# Patient Record
Sex: FEMALE | Race: WHITE | NOT HISPANIC OR LATINO | Employment: UNEMPLOYED | URBAN - NONMETROPOLITAN AREA
[De-identification: names, ages, dates, MRNs, and addresses within clinical notes are randomized per-mention and may not be internally consistent; named-entity substitution may affect disease eponyms.]

---

## 2020-11-16 ENCOUNTER — TRANSCRIBE ORDERS (OUTPATIENT)
Dept: ADMINISTRATIVE | Facility: HOSPITAL | Age: 40
End: 2020-11-16

## 2020-11-16 ENCOUNTER — LAB (OUTPATIENT)
Dept: LAB | Facility: HOSPITAL | Age: 40
End: 2020-11-16

## 2020-11-16 DIAGNOSIS — E87.0 TYPE I DIABETES MELLITUS WITH HYPEROSMOLAR COMA (HCC): Primary | ICD-10-CM

## 2020-11-16 DIAGNOSIS — E10.65 TYPE I DIABETES MELLITUS WITH HYPEROSMOLAR COMA (HCC): ICD-10-CM

## 2020-11-16 DIAGNOSIS — E10.65 TYPE I DIABETES MELLITUS WITH HYPEROSMOLAR COMA (HCC): Primary | ICD-10-CM

## 2020-11-16 DIAGNOSIS — E10.69 TYPE I DIABETES MELLITUS WITH HYPEROSMOLAR COMA (HCC): ICD-10-CM

## 2020-11-16 DIAGNOSIS — E10.69 TYPE I DIABETES MELLITUS WITH HYPEROSMOLAR COMA (HCC): Primary | ICD-10-CM

## 2020-11-16 DIAGNOSIS — E87.0 TYPE I DIABETES MELLITUS WITH HYPEROSMOLAR COMA (HCC): ICD-10-CM

## 2020-11-16 LAB
ALBUMIN SERPL-MCNC: 4.3 G/DL (ref 3.5–5.2)
ALBUMIN/GLOB SERPL: 1.7 G/DL
ALP SERPL-CCNC: 31 U/L (ref 39–117)
ALT SERPL W P-5'-P-CCNC: 9 U/L (ref 1–33)
ANION GAP SERPL CALCULATED.3IONS-SCNC: 6.8 MMOL/L (ref 5–15)
AST SERPL-CCNC: 15 U/L (ref 1–32)
BILIRUB SERPL-MCNC: 0.2 MG/DL (ref 0–1.2)
BUN SERPL-MCNC: 15 MG/DL (ref 6–20)
BUN/CREAT SERPL: 16.3 (ref 7–25)
CALCIUM SPEC-SCNC: 8.8 MG/DL (ref 8.6–10.5)
CHLORIDE SERPL-SCNC: 105 MMOL/L (ref 98–107)
CHOLEST SERPL-MCNC: 142 MG/DL (ref 0–200)
CO2 SERPL-SCNC: 26.2 MMOL/L (ref 22–29)
CREAT SERPL-MCNC: 0.92 MG/DL (ref 0.57–1)
GFR SERPL CREATININE-BSD FRML MDRD: 68 ML/MIN/1.73
GLOBULIN UR ELPH-MCNC: 2.6 GM/DL
GLUCOSE SERPL-MCNC: 137 MG/DL (ref 65–99)
HBA1C MFR BLD: 7.5 % (ref 4.8–5.6)
HDLC SERPL-MCNC: 58 MG/DL (ref 40–60)
LDLC SERPL CALC-MCNC: 75 MG/DL (ref 0–100)
LDLC/HDLC SERPL: 1.32 {RATIO}
POTASSIUM SERPL-SCNC: 3.8 MMOL/L (ref 3.5–5.2)
PROT SERPL-MCNC: 6.9 G/DL (ref 6–8.5)
SODIUM SERPL-SCNC: 138 MMOL/L (ref 136–145)
TRIGL SERPL-MCNC: 36 MG/DL (ref 0–150)
VLDLC SERPL-MCNC: 9 MG/DL (ref 5–40)

## 2020-11-16 PROCEDURE — 83036 HEMOGLOBIN GLYCOSYLATED A1C: CPT

## 2020-11-16 PROCEDURE — 36415 COLL VENOUS BLD VENIPUNCTURE: CPT

## 2020-11-16 PROCEDURE — 80053 COMPREHEN METABOLIC PANEL: CPT

## 2020-11-16 PROCEDURE — 82043 UR ALBUMIN QUANTITATIVE: CPT

## 2020-11-16 PROCEDURE — 80061 LIPID PANEL: CPT

## 2020-11-17 LAB — ALBUMIN UR-MCNC: <1.2 MG/DL

## 2021-06-12 ENCOUNTER — APPOINTMENT (OUTPATIENT)
Dept: CT IMAGING | Facility: HOSPITAL | Age: 41
End: 2021-06-12

## 2021-06-12 ENCOUNTER — HOSPITAL ENCOUNTER (EMERGENCY)
Facility: HOSPITAL | Age: 41
Discharge: HOME | End: 2021-06-12
Attending: EMERGENCY MEDICINE | Admitting: EMERGENCY MEDICINE

## 2021-06-12 VITALS
TEMPERATURE: 98.3 F | HEIGHT: 66 IN | RESPIRATION RATE: 20 BRPM | WEIGHT: 167.2 LBS | SYSTOLIC BLOOD PRESSURE: 131 MMHG | HEART RATE: 84 BPM | BODY MASS INDEX: 26.87 KG/M2 | OXYGEN SATURATION: 100 % | DIASTOLIC BLOOD PRESSURE: 84 MMHG

## 2021-06-12 DIAGNOSIS — S32.2XXS SACRUM AND COCCYX FRACTURE, SEQUELA: ICD-10-CM

## 2021-06-12 DIAGNOSIS — S22.060A COMPRESSION FRACTURE OF T8 VERTEBRA, INITIAL ENCOUNTER (HCC): Primary | ICD-10-CM

## 2021-06-12 DIAGNOSIS — S32.10XS SACRUM AND COCCYX FRACTURE, SEQUELA: ICD-10-CM

## 2021-06-12 PROCEDURE — 25010000002 MORPHINE PER 10 MG: Performed by: NURSE PRACTITIONER

## 2021-06-12 PROCEDURE — 72131 CT LUMBAR SPINE W/O DYE: CPT

## 2021-06-12 PROCEDURE — 96374 THER/PROPH/DIAG INJ IV PUSH: CPT

## 2021-06-12 PROCEDURE — 99283 EMERGENCY DEPT VISIT LOW MDM: CPT

## 2021-06-12 PROCEDURE — 96375 TX/PRO/DX INJ NEW DRUG ADDON: CPT

## 2021-06-12 PROCEDURE — 25010000002 ONDANSETRON PER 1 MG: Performed by: NURSE PRACTITIONER

## 2021-06-12 PROCEDURE — 72128 CT CHEST SPINE W/O DYE: CPT

## 2021-06-12 RX ORDER — MORPHINE SULFATE 4 MG/ML
4 INJECTION, SOLUTION INTRAMUSCULAR; INTRAVENOUS ONCE
Status: COMPLETED | OUTPATIENT
Start: 2021-06-12 | End: 2021-06-12

## 2021-06-12 RX ORDER — HYDROCODONE BITARTRATE AND ACETAMINOPHEN 7.5; 325 MG/1; MG/1
1 TABLET ORAL EVERY 6 HOURS PRN
Qty: 12 TABLET | Refills: 0 | Status: SHIPPED | OUTPATIENT
Start: 2021-06-12

## 2021-06-12 RX ORDER — SODIUM CHLORIDE 0.9 % (FLUSH) 0.9 %
10 SYRINGE (ML) INJECTION AS NEEDED
Status: DISCONTINUED | OUTPATIENT
Start: 2021-06-12 | End: 2021-06-12 | Stop reason: HOSPADM

## 2021-06-12 RX ORDER — INSULIN GLARGINE 100 [IU]/ML
INJECTION, SOLUTION SUBCUTANEOUS
COMMUNITY

## 2021-06-12 RX ORDER — ONDANSETRON 4 MG/1
4 TABLET, ORALLY DISINTEGRATING ORAL EVERY 6 HOURS PRN
Qty: 20 TABLET | Refills: 0 | Status: SHIPPED | OUTPATIENT
Start: 2021-06-12

## 2021-06-12 RX ORDER — ONDANSETRON 2 MG/ML
4 INJECTION INTRAMUSCULAR; INTRAVENOUS ONCE
Status: COMPLETED | OUTPATIENT
Start: 2021-06-12 | End: 2021-06-12

## 2021-06-12 RX ORDER — HYDROCODONE BITARTRATE AND ACETAMINOPHEN 7.5; 325 MG/1; MG/1
1 TABLET ORAL ONCE
Status: COMPLETED | OUTPATIENT
Start: 2021-06-12 | End: 2021-06-12

## 2021-06-12 RX ADMIN — HYDROCODONE BITARTRATE AND ACETAMINOPHEN 1 TABLET: 7.5; 325 TABLET ORAL at 13:38

## 2021-06-12 RX ADMIN — MORPHINE SULFATE 4 MG: 4 INJECTION, SOLUTION INTRAMUSCULAR; INTRAVENOUS at 12:04

## 2021-06-12 RX ADMIN — ONDANSETRON 4 MG: 2 INJECTION INTRAMUSCULAR; INTRAVENOUS at 12:04

## 2021-06-12 NOTE — ED NOTES
Unable to contact [employer] (On-Call), Pt is tired of waiting and wishes to be D/C without TSLO brace.      Jenise Manning, RN  06/12/21 7183

## 2021-06-12 NOTE — ED PROVIDER NOTES
Subjective   History of Present Illness  \this is a 40 year old female who comes in today after a slip and fall on her stairs. She reports she lost her balance striking the top stairs with the middle of her back. She also hit the lower back near her buttocks as well. She denies any loss of bowel or bladder. She reports taking otc meds with some relief.   Review of Systems   Constitutional: Negative.    HENT: Negative.    Eyes: Negative.    Respiratory: Negative.    Cardiovascular: Negative.    Gastrointestinal: Negative.    Genitourinary: Negative.    Musculoskeletal: Positive for back pain.   Skin: Negative.    Neurological: Negative.    Psychiatric/Behavioral: Negative.        Past Medical History:   Diagnosis Date   • Diabetes mellitus (CMS/HCC)        No Known Allergies    Past Surgical History:   Procedure Laterality Date   •  SECTION     • EYE SURGERY      x 3   • TONSILLECTOMY         History reviewed. No pertinent family history.    Social History     Socioeconomic History   • Marital status:      Spouse name: Not on file   • Number of children: Not on file   • Years of education: Not on file   • Highest education level: Not on file   Tobacco Use   • Smoking status: Never Smoker   • Smokeless tobacco: Never Used   Substance and Sexual Activity   • Alcohol use: No   • Drug use: No   • Sexual activity: Defer           Objective   Physical Exam  Vitals and nursing note reviewed.   Constitutional:       Appearance: Normal appearance. She is normal weight.   Neurological:      Mental Status: She is alert.     Primary survey  Airway patent  Bilateral breath sounds  Strong radial and femoral pulses  GCS 15    Secondary survey  general: patient appears uncomfortable, nontoxic  Head: Atraumatic, normocephalic  Eyes: Pupils equal round reactive to light, extra ocular movements are intact, vision grossly normal  ENT: No facial step-offs, no dental malocclusion  Neck: Nontender to palpation of the  C-spine, full range of motion, trachea midline  Chest: Nontender to palpation  Cardiovascular: Regular rate  Lungs: Bilateral breath sounds, clear to auscultation bilaterally  Back: T and L-spines are tender to palpation, no step offs  Abdomen: Soft, nontender, nondistended  Pelvis: Stable  Extremities: Full range of motion in all 4 extremities, no evidence of gross deformity or laceration  Neuro: Sensation grossly intact to light touch in all 4 extremities        Procedures           ED Course  ED Course as of Jun 12 1443   Sat Jun 12, 2021   1333 Unable to reach the [employer] representative to place brace. Patient will follow up with Dr. Garcia for placement. I have discussed the plan and they are agreeable.     [TW]      ED Course User Index  [TW] Christiana Melgar, APRN                                           MDM  Number of Diagnoses or Management Options     Amount and/or Complexity of Data Reviewed  Tests in the radiology section of CPT®: ordered and reviewed  Review and summarize past medical records: yes  Discuss the patient with other providers: yes    Risk of Complications, Morbidity, and/or Mortality  Presenting problems: moderate  Diagnostic procedures: moderate  Management options: moderate        Final diagnoses:   Compression fracture of T8 vertebra, initial encounter (CMS/[facility])   Sacrum and coccyx fracture, sequela       ED Disposition  ED Disposition     ED Disposition Condition Comment    Discharge Stable           Ulisses Garcia MD  [address] [place] [address] [phone]    Schedule an appointment as soon as possible for a visit            Medication List      New Prescriptions    HYDROcodone-acetaminophen 7.5-325 MG per tablet  Commonly known as: NORCO  Take 1 tablet by mouth Every 6 (Six) Hours As Needed for Moderate Pain .     ondansetron ODT 4 MG disintegrating tablet  Commonly known as: ZOFRAN-ODT  Place 1 tablet on the tongue Every 6 (Six) Hours As Needed for Nausea.            Where to Get Your Medications      These medications were sent to [id number] - [place], [place] - [address] [place] [address] [address]OP[address] [place] AT [facility] [place] & - [phone] PH - [phone] FX  [address] [facility] [address]    Phone: [phone]   · HYDROcodone-acetaminophen 7.5-325 MG per tablet  · ondansetron ODT 4 MG disintegrating tablet          Christiana Melgar, FLASH  06/12/21 1349       Christiana Melgar, FLASH  06/12/21 1446

## 2021-10-21 ENCOUNTER — TRANSCRIBE ORDERS (OUTPATIENT)
Dept: ADMINISTRATIVE | Facility: HOSPITAL | Age: 41
End: 2021-10-21

## 2021-10-21 DIAGNOSIS — R01.1 MURMUR, HEART: Primary | ICD-10-CM

## 2021-10-21 DIAGNOSIS — R00.2 PALPITATIONS: ICD-10-CM

## 2021-12-21 ENCOUNTER — TRANSCRIBE ORDERS (OUTPATIENT)
Dept: ADMINISTRATIVE | Facility: HOSPITAL | Age: 41
End: 2021-12-21

## 2021-12-21 DIAGNOSIS — S22.060D CLOSED WEDGE COMPRESSION FRACTURE OF T7 VERTEBRA WITH ROUTINE HEALING: Primary | ICD-10-CM

## 2022-01-12 ENCOUNTER — HOSPITAL ENCOUNTER (OUTPATIENT)
Dept: MRI IMAGING | Facility: HOSPITAL | Age: 42
Discharge: HOME | End: 2022-01-12
Admitting: ORTHOPAEDIC SURGERY

## 2022-01-12 DIAGNOSIS — S22.060D CLOSED WEDGE COMPRESSION FRACTURE OF T7 VERTEBRA WITH ROUTINE HEALING: ICD-10-CM

## 2022-01-12 PROCEDURE — 72146 MRI CHEST SPINE W/O DYE: CPT

## 2022-05-10 ENCOUNTER — TREATMENT (OUTPATIENT)
Dept: PHYSICAL THERAPY | Facility: CLINIC | Age: 42
End: 2022-05-10

## 2022-05-10 DIAGNOSIS — S22.060D CLOSED WEDGE COMPRESSION FRACTURE OF T8 VERTEBRA WITH ROUTINE HEALING, SUBSEQUENT ENCOUNTER: ICD-10-CM

## 2022-05-10 DIAGNOSIS — Z87.81 HISTORY OF COMPRESSION FRACTURE OF SPINE: Primary | ICD-10-CM

## 2022-05-10 PROCEDURE — 97161 PT EVAL LOW COMPLEX 20 MIN: CPT | Performed by: PHYSICAL THERAPIST

## 2022-05-10 NOTE — PROGRESS NOTES
Physical Therapy Initial Evaluation and Plan of Care      Patient: Ashleigh Ward   : 1980  Diagnosis/ICD-10 Code:  No primary diagnosis found.  Referring practitioner: Pavel Robertson, *    Subjective Evaluation    History of Present Illness  Date of onset: 2021  Mechanism of injury: Pt reports falling on her tailbone and breaking her T8 and tailbone last summer. Pt reports that her tailbone is fine and according to MRI and xrays show, her T8 has healed well although she has had pain right under L shoulder blade around T8. Pt states that standing makes pain worse. Pt reports that slumping over makes pain better.       Patient Occupation: Pharmacy Pain  Current pain ratin  At best pain ratin  At worst pain ratin  Quality: throbbing and dull ache  Relieving factors: medications and ice  Aggravating factors: standing and ambulation  Progression: no change    Social Support  Lives in: multiple-level home  Lives with: spouse and young children    Diagnostic Tests  X-ray: normal  MRI studies: normal    Treatments  Previous treatment: medication  Current treatment: medication  Patient Goals  Patient goals for therapy: decreased pain, increased motion and increased strength             Objective          Palpation   Left   Tenderness of the lower trapezius.     Right   No palpable tenderness to the lower trapezius.     Tenderness   Cervical Spine   Tenderness in the spinous process.     Additional Tenderness Details  T6 tender with diffuse tenderness from t5-t8    Neurological Testing     Reflexes   Left   Biceps (C5/C6): normal (2+)  Brachioradialis (C6): normal (2+)  Triceps (C7): normal (2+)  Patellar (L4): normal (2+)  Achilles (S1): normal (2+)    Right   Biceps (C5/C6): normal (2+)  Brachioradialis (C6): normal (2+)  Triceps (C7): normal (2+)  Patellar (L4): normal (2+)  Achilles (S1): normal (2+)    Active Range of Motion   Cervical/Thoracic Spine     Thoracic   Flexion:  WFL  Extension: WFL  Left lateral flexion: WFL  Right lateral flexion: WFL  Left rotation: WFL  Right rotation: WFL    Lumbar   Flexion: WFL  Extension: WFL  Left lateral flexion: WFL  Right lateral flexion: WFL  Left rotation: WFL  Right rotation: WFL          Assessment & Plan     Assessment  Impairments: activity intolerance, impaired physical strength, lacks appropriate home exercise program and pain with function  Functional Limitations: lifting, uncomfortable because of pain and standing  Assessment details: Patient is a 41 year old female who comes to physical therapy following a compression fracture in the thoracic spine. Signs and symptoms are consistent with immobility following fracture in the spine resulting in pain, decreased strength, and inability to perform all essential functional activities. Pt will benefit from skilled PT services to address the above issues.     Prognosis: good    Goals  Plan Goals: Short Term Goals (2 weeks)  1. Pt will demonstrate independence with HEP  2. Pt will have no more than 2/10 pain in the back and ribs following work shift  3. Pt will have no pain with 30 mins of activities for the back in the gym    Long Term Goals (6 weeks)  1. Pt will have no increased pain in the thoracic spine and ribs following a work shift.   2. Pt will have no tenderness in the L thoracic spine with palpation  3. Pt will be able to tolerate 60 mins of activities in the clinic without pain in the thoracic spine.     Plan  Therapy options: will be seen for skilled therapy services  Planned modality interventions: dry needling, cryotherapy, TENS and ultrasound  Planned therapy interventions: abdominal trunk stabilization, body mechanics training, flexibility, functional ROM exercises, home exercise program, joint mobilization, manual therapy, motor coordination training, neuromuscular re-education, postural training, soft tissue mobilization, spinal/joint mobilization, strengthening, stretching  and therapeutic activities  Frequency: 2x week  Duration in weeks: 6  Treatment plan discussed with: patient        Manual Therapy:         mins  79627;  Therapeutic Exercise:         mins  54781;     Neuromuscular Lisa:        mins  22027;    Therapeutic Activity:          mins  81669;     Gait Training:           mins  42302;     Ultrasound:          mins  51916;    Electrical Stimulation:         mins  82244 ( );  Dry Needling          mins self-pay    Timed Treatment:      mins   Total Treatment:     41   mins    PT SIGNATURE: Javid Peralta PT   [place] License: [id number]  DATE TREATMENT INITIATED: 5/10/2022    Initial Certification  Certification Period: 8/7/2022  I certify that the therapy services are furnished while this patient is under my care.  The services outlined above are required by this patient, and will be reviewed every 90 days.     PHYSICIAN: Pavel Robertson MD      DATE:     Please sign and return via fax to [phone].. Thank you, [facility].

## 2022-05-17 ENCOUNTER — TREATMENT (OUTPATIENT)
Dept: PHYSICAL THERAPY | Facility: CLINIC | Age: 42
End: 2022-05-17

## 2022-05-17 DIAGNOSIS — S22.060D CLOSED WEDGE COMPRESSION FRACTURE OF T8 VERTEBRA WITH ROUTINE HEALING, SUBSEQUENT ENCOUNTER: ICD-10-CM

## 2022-05-17 DIAGNOSIS — Z87.81 HISTORY OF COMPRESSION FRACTURE OF SPINE: Primary | ICD-10-CM

## 2022-05-17 PROCEDURE — 97110 THERAPEUTIC EXERCISES: CPT | Performed by: PHYSICAL THERAPIST

## 2022-05-17 PROCEDURE — 97112 NEUROMUSCULAR REEDUCATION: CPT | Performed by: PHYSICAL THERAPIST

## 2022-05-17 PROCEDURE — 97140 MANUAL THERAPY 1/> REGIONS: CPT | Performed by: PHYSICAL THERAPIST

## 2022-05-19 NOTE — PROGRESS NOTES
Physical Therapy Daily Progress Note      Visit #: 2    Ashleigh Ward reports 1-2/10 pain today at rest.  Pt reports that she felt better for a couple days after her thoracic spine popped. Pt reports that the stretches feel good but only give temporary relief. Pt reports that she still has discomfort in the thoracic spine that is extending into the chest.         Objective Pt present to PT today with no distress at rest.     Pt with no increased pain in the thoracic spine with activities today.     Pt with tenderness in the L thoracic spine with palpation from T 6-10.       See Exercise, Manual, and Modality Logs for complete treatment.     Assessment/Plan  Pt is doing well with stretching and scapular strengthening activities. Pt seemed to do well with needling today and will follow up with PT as tolerated and will assess reaction to treatment.       Progress per Plan of Care      Visit Diagnosis:    ICD-10-CM ICD-9-CM   1. History of compression fracture of spine  Z87.81 V15.51   2. Closed wedge compression fracture of T8 vertebra with routine healing, subsequent encounter  S22.060D V54.17            Manual Therapy:  17      mins  54343;  Therapeutic Exercise:    17     mins  32539;     Neuromuscular Lisa:    10    mins  28477;    Therapeutic Activity:          mins  45931;     Gait Training:           mins  77978;     Ultrasound:          mins  26002;    Electrical Stimulation:         mins  84088 ( );  Dry Needling          mins self-pay  Iontophoresis          mins 78213      Timed Treatment:   44   mins   Total Treatment:     57   mins    Javid Peralta, PT  Physical Therapist

## 2022-05-20 ENCOUNTER — TREATMENT (OUTPATIENT)
Dept: PHYSICAL THERAPY | Facility: CLINIC | Age: 42
End: 2022-05-20

## 2022-05-20 DIAGNOSIS — S22.060D CLOSED WEDGE COMPRESSION FRACTURE OF T8 VERTEBRA WITH ROUTINE HEALING, SUBSEQUENT ENCOUNTER: ICD-10-CM

## 2022-05-20 DIAGNOSIS — Z87.81 HISTORY OF COMPRESSION FRACTURE OF SPINE: Primary | ICD-10-CM

## 2022-05-20 PROCEDURE — 97140 MANUAL THERAPY 1/> REGIONS: CPT | Performed by: PHYSICAL THERAPIST

## 2022-05-20 PROCEDURE — 97112 NEUROMUSCULAR REEDUCATION: CPT | Performed by: PHYSICAL THERAPIST

## 2022-05-20 PROCEDURE — 97110 THERAPEUTIC EXERCISES: CPT | Performed by: PHYSICAL THERAPIST

## 2022-05-20 NOTE — PROGRESS NOTES
Physical Therapy Daily Progress Note      Visit #: 3    Ashleigh Ward reports 0/10 pain today at rest.  Pt reports that she has no pain at this time and has had less in the L ribs recently. Pt reports that she was a little sore after last session but feels good now.         Objective Pt present to PT today with no distress at rest.     Pt with minimal tenderness through the L thoracic spine with palpation.     Pt with no increased pain in the thoracic spine with activities today.     Pt had to leave 15 mins early today and cut visit short.       See Exercise, Manual, and Modality Logs for complete treatment.     Assessment/Plan  Pt continues to have some pain in the thoracic spine that gets worse with work and throughout the day. Pt to continue with treatment as tolerated to help improve pain free function, mobility, and strength.       Progress per Plan of Care      Visit Diagnosis:    ICD-10-CM ICD-9-CM   1. History of compression fracture of spine  Z87.81 V15.51   2. Closed wedge compression fracture of T8 vertebra with routine healing, subsequent encounter  S22.060D V54.17            Manual Therapy:    17     mins  62545;  Therapeutic Exercise:    10     mins  24738;     Neuromuscular Lisa:   9     mins  66822;    Therapeutic Activity:          mins  11897;     Gait Training:           mins  29132;     Ultrasound:          mins  46313;    Electrical Stimulation:         mins  62709 ( );  Dry Needling          mins self-pay  Iontophoresis          mins 43503      Timed Treatment:   36   mins   Total Treatment:     36   mins    Javid Peralta, PT  Physical Therapist

## 2022-05-26 ENCOUNTER — TREATMENT (OUTPATIENT)
Dept: PHYSICAL THERAPY | Facility: CLINIC | Age: 42
End: 2022-05-26

## 2022-05-26 DIAGNOSIS — S22.060D CLOSED WEDGE COMPRESSION FRACTURE OF T8 VERTEBRA WITH ROUTINE HEALING, SUBSEQUENT ENCOUNTER: ICD-10-CM

## 2022-05-26 DIAGNOSIS — Z87.81 HISTORY OF COMPRESSION FRACTURE OF SPINE: Primary | ICD-10-CM

## 2022-05-26 PROCEDURE — 97140 MANUAL THERAPY 1/> REGIONS: CPT | Performed by: PHYSICAL THERAPIST

## 2022-05-26 PROCEDURE — 97112 NEUROMUSCULAR REEDUCATION: CPT | Performed by: PHYSICAL THERAPIST

## 2022-05-26 PROCEDURE — 97110 THERAPEUTIC EXERCISES: CPT | Performed by: PHYSICAL THERAPIST

## 2022-05-26 NOTE — PROGRESS NOTES
Physical Therapy Daily Progress Note      Visit #: 4    Ashleigh Ward reports 0/10 pain today at rest.  Pt reports that her back and shoulder blade are not hurting today although the pain still comes and goes. Pt reports that 1-2 days ago, the pain was a 7/10 in the L scapula with pain wrapping around the front of the ribs. Pt reports that her doctor wants to do her trigger point injections.         Objective Pt present to PT today with no distress at rest.     Pt with tenderness over the lower thoracic spine and into the musculature between the spine and shoulder blade.     Pt with no increased pain in the shoulder blade or ribs with activities today.       See Exercise, Manual, and Modality Logs for complete treatment.     Assessment/Plan  Pt continues to have upper back and rip pain and irritation. Pt to continue with PT for 2-4 more weeks to see if she begins to respond to activities in the clinic. Pt may benefit from PT to help improve thoracic pain and activity tolerance.       Progress per Plan of Care      Visit Diagnosis:    ICD-10-CM ICD-9-CM   1. History of compression fracture of spine  Z87.81 V15.51   2. Closed wedge compression fracture of T8 vertebra with routine healing, subsequent encounter  S22.060D V54.17            Manual Therapy:    25     mins  49682;  Therapeutic Exercise:    14     mins  26560;     Neuromuscular Lisa:    14    mins  23107;    Therapeutic Activity:          mins  31006;     Gait Training:           mins  11693;     Ultrasound:          mins  62670;    Electrical Stimulation:         mins  41886 ( );  Dry Needling          mins self-pay  Iontophoresis          mins 22166      Timed Treatment:  53    mins   Total Treatment:     62   mins    Javid Peralta, PT  Physical Therapist

## 2022-05-31 ENCOUNTER — TREATMENT (OUTPATIENT)
Dept: PHYSICAL THERAPY | Facility: CLINIC | Age: 42
End: 2022-05-31

## 2022-05-31 DIAGNOSIS — Z87.81 HISTORY OF COMPRESSION FRACTURE OF SPINE: Primary | ICD-10-CM

## 2022-05-31 DIAGNOSIS — S22.060D CLOSED WEDGE COMPRESSION FRACTURE OF T8 VERTEBRA WITH ROUTINE HEALING, SUBSEQUENT ENCOUNTER: ICD-10-CM

## 2022-05-31 PROCEDURE — 97110 THERAPEUTIC EXERCISES: CPT | Performed by: PHYSICAL THERAPIST

## 2022-05-31 PROCEDURE — 97140 MANUAL THERAPY 1/> REGIONS: CPT | Performed by: PHYSICAL THERAPIST

## 2022-06-02 ENCOUNTER — TELEPHONE (OUTPATIENT)
Dept: PHYSICAL THERAPY | Facility: OTHER | Age: 42
End: 2022-06-02

## 2022-06-02 NOTE — PROGRESS NOTES
Physical Therapy Daily Progress Note      Visit #: 5    Ashleigh Ward reports 0/10 pain today at rest.  Pt reports that she has been very busy today so she is tired although her back is not hurting. Pt reports that the rib pain is better since last visit and has not noticed it as much. Pt reports that her shoulder was sore after last session.         Objective Pt present to PT today with no distress at rest.     Pt with no increased pain in the thoracic spine     Pt with tenderness over T8-10 with tenderness extending out to the L scapula.       See Exercise, Manual, and Modality Logs for complete treatment.     Assessment/Plan  Pt continues to have some pain in the back that comes and goes with her daily activities. Pt seems to be responding to activities in the clinic well and will continue with treatment to help improve thoracic pain, function, and activity tolerance.       Progress per Plan of Care      Visit Diagnosis:    ICD-10-CM ICD-9-CM   1. History of compression fracture of spine  Z87.81 V15.51   2. Closed wedge compression fracture of T8 vertebra with routine healing, subsequent encounter  S22.060D V54.17            Manual Therapy:    10     mins  02644;  Therapeutic Exercise:    15     mins  39686;     Neuromuscular Lisa:        mins  55612;    Therapeutic Activity:          mins  80351;     Gait Training:           mins  51656;     Ultrasound:          mins  74044;    Electrical Stimulation:         mins  81139 ( );  Dry Needling          mins self-pay  Iontophoresis          mins 00864      Timed Treatment:   25   mins   Total Treatment:     46   mins    Javid Peralta, PT  Physical Therapist

## 2022-06-22 ENCOUNTER — TELEPHONE (OUTPATIENT)
Dept: PHYSICAL THERAPY | Facility: CLINIC | Age: 42
End: 2022-06-22

## 2022-06-22 NOTE — TELEPHONE ENCOUNTER
HAVE NOT RECEIVED PLAN OF CARE BACK FROM REFERRING PROVIDER. LAST FAXED ON 06/07 AND CALLED AND LVM WITH REFERRING OFFICE ON 0610.     WILL NEED SIGNED POC RETURNED BEFORE PATIENTS CAN SCHEDULE ANY FURTHER TREATMENTS

## 2023-02-24 ENCOUNTER — HOSPITAL ENCOUNTER (OUTPATIENT)
Dept: GENERAL RADIOLOGY | Facility: HOSPITAL | Age: 43
Discharge: HOME | End: 2023-02-24
Payer: COMMERCIAL

## 2023-02-24 ENCOUNTER — TRANSCRIBE ORDERS (OUTPATIENT)
Dept: GENERAL RADIOLOGY | Facility: HOSPITAL | Age: 43
End: 2023-02-24
Payer: COMMERCIAL

## 2023-02-24 DIAGNOSIS — M47.814 SPONDYLOSIS WITHOUT MYELOPATHY OR RADICULOPATHY, THORACIC REGION: ICD-10-CM

## 2023-02-24 DIAGNOSIS — M47.814 SPONDYLOSIS WITHOUT MYELOPATHY OR RADICULOPATHY, THORACIC REGION: Primary | ICD-10-CM

## 2023-02-24 PROCEDURE — 72072 X-RAY EXAM THORAC SPINE 3VWS: CPT

## 2024-04-02 ENCOUNTER — TRANSCRIBE ORDERS (OUTPATIENT)
Dept: ADMINISTRATIVE | Facility: HOSPITAL | Age: 44
End: 2024-04-02
Payer: COMMERCIAL

## 2024-04-02 DIAGNOSIS — R01.1 MURMUR, CARDIAC: Primary | ICD-10-CM

## 2024-04-05 ENCOUNTER — TRANSCRIBE ORDERS (OUTPATIENT)
Dept: LAB | Facility: HOSPITAL | Age: 44
End: 2024-04-05
Payer: COMMERCIAL

## 2024-04-05 ENCOUNTER — LAB (OUTPATIENT)
Dept: LAB | Facility: HOSPITAL | Age: 44
End: 2024-04-05
Payer: COMMERCIAL

## 2024-04-05 DIAGNOSIS — R12 HEARTBURN: Primary | ICD-10-CM

## 2024-04-05 PROCEDURE — 83013 H PYLORI (C-13) BREATH: CPT

## 2024-04-08 LAB — UREA BREATH TEST QL: NEGATIVE
